# Patient Record
Sex: MALE | Race: WHITE | NOT HISPANIC OR LATINO | URBAN - METROPOLITAN AREA
[De-identification: names, ages, dates, MRNs, and addresses within clinical notes are randomized per-mention and may not be internally consistent; named-entity substitution may affect disease eponyms.]

---

## 2023-07-10 ENCOUNTER — OFFICE VISIT (OUTPATIENT)
Dept: PRIMARY CARE | Facility: CLINIC | Age: 38
End: 2023-07-10
Payer: COMMERCIAL

## 2023-07-10 VITALS
SYSTOLIC BLOOD PRESSURE: 123 MMHG | OXYGEN SATURATION: 94 % | WEIGHT: 224.8 LBS | BODY MASS INDEX: 33.3 KG/M2 | HEIGHT: 69 IN | DIASTOLIC BLOOD PRESSURE: 87 MMHG | TEMPERATURE: 97.8 F | HEART RATE: 83 BPM

## 2023-07-10 DIAGNOSIS — F90.2 ATTENTION DEFICIT HYPERACTIVITY DISORDER (ADHD), COMBINED TYPE: Primary | ICD-10-CM

## 2023-07-10 PROCEDURE — 1036F TOBACCO NON-USER: CPT | Performed by: FAMILY MEDICINE

## 2023-07-10 PROCEDURE — 99203 OFFICE O/P NEW LOW 30 MIN: CPT | Performed by: FAMILY MEDICINE

## 2023-07-10 RX ORDER — DEXTROAMPHETAMINE SACCHARATE, AMPHETAMINE ASPARTATE MONOHYDRATE, DEXTROAMPHETAMINE SULFATE AND AMPHETAMINE SULFATE 7.5; 7.5; 7.5; 7.5 MG/1; MG/1; MG/1; MG/1
30 CAPSULE, EXTENDED RELEASE ORAL EVERY MORNING
COMMUNITY
Start: 2023-03-06 | End: 2023-07-27 | Stop reason: SDUPTHER

## 2023-07-10 RX ORDER — ATOMOXETINE 40 MG/1
40 CAPSULE ORAL 2 TIMES DAILY
Qty: 60 CAPSULE | Refills: 0 | Status: SHIPPED | OUTPATIENT
Start: 2023-07-10 | End: 2023-10-18 | Stop reason: ALTCHOICE

## 2023-07-10 RX ORDER — RIFAXIMIN 550 MG/1
550 TABLET ORAL 3 TIMES DAILY
COMMUNITY
Start: 2022-09-03 | End: 2023-07-10 | Stop reason: ALTCHOICE

## 2023-07-10 ASSESSMENT — ENCOUNTER SYMPTOMS
NERVOUS/ANXIOUS: 0
SLEEP DISTURBANCE: 0
SHORTNESS OF BREATH: 0
EYE REDNESS: 0
DECREASED CONCENTRATION: 1
DYSPHORIC MOOD: 0
PALPITATIONS: 0

## 2023-07-10 NOTE — ASSESSMENT & PLAN NOTE
OARRS checked and all Rxs were from Dr. Chilo Forde. He last had Rx on 3/6/23 per OARRS. No other controlled rxs on OARRS. Will need to return for ADD evaluation appointment.  He is willing to try Strattera in the interim. Records being sent.

## 2023-07-10 NOTE — PROGRESS NOTES
"Subjective   Patient ID: Joseph Behra is a 38 y.o. male who presents for Establish Care (To establish and get medication refills on Adderall. ).    Here as new patient. Dr. Forde prescribed him Adderall for 5-6 yrs. He was trying to get off it. He is back to working for a company. Not working effeiciently. Having issues with executive function, getting things organized. He is thinking he needs to go back on medication. He has not been on a non-stimulant.     He has been off the meds for 5-6 months. He has been struggling.   No history of anxiety or depression. Diagnosed with ADD as child and was to go on Ritalin. Parents objected to it. He always struggled in school and wishes he had been on it. Had not ever been on it until 6 yrs ago or so.     Last cholesterol and sugar were normal 3 yrs ago. He has more appetite now than he did in the past.     No family history of cancer, heart disease or diabetes. Not sure what is up with Dad as on lot of medication.                Current Outpatient Medications:     amphetamine-dextroamphetamine XR (Adderall XR) 30 mg 24 hr capsule, Take 1 capsule (30 mg) by mouth once daily in the morning., Disp: , Rfl:     Patient Active Problem List   Diagnosis    Attention deficit hyperactivity disorder (ADHD), combined type         Review of Systems   Eyes:  Negative for redness.   Respiratory:  Negative for shortness of breath.    Cardiovascular:  Negative for chest pain, palpitations and leg swelling.   Psychiatric/Behavioral:  Positive for decreased concentration. Negative for dysphoric mood and sleep disturbance. The patient is not nervous/anxious.        Objective   /87 (BP Location: Left arm, Patient Position: Sitting)   Pulse 83   Temp 36.6 °C (97.8 °F)   Ht 1.753 m (5' 9\")   Wt 102 kg (224 lb 12.8 oz)   SpO2 94%   BMI 33.20 kg/m²     Physical Exam  Vitals reviewed.   Constitutional:       General: He is not in acute distress.     Appearance: He is not ill-appearing. "   Neck:      Vascular: No carotid bruit.   Cardiovascular:      Rate and Rhythm: Normal rate and regular rhythm.      Pulses: Normal pulses.      Heart sounds: No murmur heard.  Pulmonary:      Effort: Pulmonary effort is normal.      Breath sounds: Normal breath sounds.   Musculoskeletal:      Left lower leg: No edema.   Skin:     Findings: No rash.   Neurological:      Mental Status: He is alert.   Psychiatric:         Mood and Affect: Mood normal.         Assessment/Plan   Problem List Items Addressed This Visit       Attention deficit hyperactivity disorder (ADHD), combined type - Primary     OARRS checked and all Rxs were from Dr. Chilo Forde. He last had Rx on 3/6/23 per OARRS. No other controlled rxs on OARRS. Will need to return for ADD evaluation appointment.  He is willing to try Strattera in the interim. Records being sent.               Assessment, plans, tests, and follow up discussed with patient and patient verbalized understanding. Ramy was given an opportunity to ask questions and  any concerns were addressed including but not limited to medications, need for evaluation. Follow up and need for records. .

## 2023-07-27 ENCOUNTER — OFFICE VISIT (OUTPATIENT)
Dept: PRIMARY CARE | Facility: CLINIC | Age: 38
End: 2023-07-27
Payer: COMMERCIAL

## 2023-07-27 VITALS
OXYGEN SATURATION: 94 % | SYSTOLIC BLOOD PRESSURE: 128 MMHG | RESPIRATION RATE: 17 BRPM | TEMPERATURE: 97.8 F | DIASTOLIC BLOOD PRESSURE: 87 MMHG | WEIGHT: 231 LBS | HEART RATE: 77 BPM | BODY MASS INDEX: 34.21 KG/M2 | HEIGHT: 69 IN

## 2023-07-27 DIAGNOSIS — F90.2 ATTENTION DEFICIT HYPERACTIVITY DISORDER (ADHD), COMBINED TYPE: Primary | ICD-10-CM

## 2023-07-27 PROCEDURE — 99213 OFFICE O/P EST LOW 20 MIN: CPT | Performed by: FAMILY MEDICINE

## 2023-07-27 PROCEDURE — 1036F TOBACCO NON-USER: CPT | Performed by: FAMILY MEDICINE

## 2023-07-27 RX ORDER — DEXTROAMPHETAMINE SACCHARATE, AMPHETAMINE ASPARTATE MONOHYDRATE, DEXTROAMPHETAMINE SULFATE AND AMPHETAMINE SULFATE 7.5; 7.5; 7.5; 7.5 MG/1; MG/1; MG/1; MG/1
30 CAPSULE, EXTENDED RELEASE ORAL EVERY MORNING
Qty: 30 CAPSULE | Refills: 0 | Status: SHIPPED | OUTPATIENT
Start: 2023-07-27 | End: 2023-10-18 | Stop reason: SDUPTHER

## 2023-07-27 ASSESSMENT — ENCOUNTER SYMPTOMS
DYSPHORIC MOOD: 0
AGITATION: 0
CONFUSION: 0
HYPERACTIVE: 0
DEPRESSION: 0
OCCASIONAL FEELINGS OF UNSTEADINESS: 0
DECREASED CONCENTRATION: 0
NERVOUS/ANXIOUS: 0
PALPITATIONS: 0
HALLUCINATIONS: 0
LOSS OF SENSATION IN FEET: 0

## 2023-07-27 ASSESSMENT — PATIENT HEALTH QUESTIONNAIRE - PHQ9
SUM OF ALL RESPONSES TO PHQ9 QUESTIONS 1 AND 2: 0
1. LITTLE INTEREST OR PLEASURE IN DOING THINGS: NOT AT ALL
2. FEELING DOWN, DEPRESSED OR HOPELESS: NOT AT ALL

## 2023-07-27 ASSESSMENT — COLUMBIA-SUICIDE SEVERITY RATING SCALE - C-SSRS
2. HAVE YOU ACTUALLY HAD ANY THOUGHTS OF KILLING YOURSELF?: NO
6. HAVE YOU EVER DONE ANYTHING, STARTED TO DO ANYTHING, OR PREPARED TO DO ANYTHING TO END YOUR LIFE?: NO
1. IN THE PAST MONTH, HAVE YOU WISHED YOU WERE DEAD OR WISHED YOU COULD GO TO SLEEP AND NOT WAKE UP?: NO

## 2023-07-27 NOTE — PROGRESS NOTES
"Subjective   Patient ID: Joseph Behra is a 38 y.o. male who presents for Follow-up (Follow up on ADHD).    Here to evaluate for ADHD and resumption of stimulant therapy.   Diagnosed at age 10. Did welll ofn Adderall XR 30 mg daily. Tried Strattera and did not like side effects. Was on Vyvanse in the past.            Current Outpatient Medications:     atomoxetine (Strattera) 40 mg capsule, Take 1 capsule (40 mg) by mouth 2 times a day. Swallow capsule whole; do not open. If opened accidentally, do not touch eyes; wash hands immediately (product is an eye irritant)., Disp: 60 capsule, Rfl: 0    amphetamine-dextroamphetamine XR (Adderall XR) 30 mg 24 hr capsule, Take 1 capsule (30 mg) by mouth once daily in the morning., Disp: 30 capsule, Rfl: 0    Patient Active Problem List   Diagnosis    Attention deficit hyperactivity disorder (ADHD), combined type         Review of Systems   Cardiovascular:  Negative for chest pain, palpitations and leg swelling.   Psychiatric/Behavioral:  Negative for agitation, behavioral problems, confusion, decreased concentration, dysphoric mood, hallucinations and self-injury. The patient is not nervous/anxious and is not hyperactive.        Objective   /87 (BP Location: Left arm, Patient Position: Sitting, BP Cuff Size: Large adult)   Pulse 77   Temp 36.6 °C (97.8 °F)   Resp 17   Ht 1.753 m (5' 9\")   Wt 105 kg (231 lb)   SpO2 94%   BMI 34.11 kg/m²     Physical Exam    Assessment/Plan   Problem List Items Addressed This Visit       Attention deficit hyperactivity disorder (ADHD), combined type - Primary    Relevant Medications    amphetamine-dextroamphetamine XR (Adderall XR) 30 mg 24 hr capsule    Other Relevant Orders    Follow Up In Primary Care         Assessment, plans, tests, and follow up discussed with patient and patient verbalized understanding. Ramy was given an opportunity to ask questions and  any concerns were addressed including but not limited to labs, old " diagnoses, medications, follow up.

## 2023-07-27 NOTE — ASSESSMENT & PLAN NOTE
Anticoagulation  Patient is here for anticoagulation follow-up. Indication: atrial fibrillation  Bleeding Signs/Symptoms:  None  Thromboembolic Signs/Symptoms:  None    Missed Coumadin Doses:  None  Medication Changes:  no  Dietary Changes:  no  Bacterial/Viral Infection:  yes - patient has a cold    Other Concerns:  no    Last INR: 2.3            Today's INR: 2.9        INR Goal: (X) 2.0-3.0  ( ) 2.5-3.5  ( ) ________    Advised patient to take warfarin as noted on medication chart given today:  Take 2.5 tablets (6.25mg) on Tuesdays and Saturdays and take 2 tablets (5mg) all other days  Total weekly dosage of warfarin: 37.5 mg    Return for INR recheck in/on: four weeks    10 minutes was spent in reviewing and counseling regarding foods high in vitamin K, the importance of taking medication daily and watching for any medication side effects such as bleeding for which the patient will call for advice or go the the Emergency Room immediately. Patient was notified that their INR result was within therapeutic range. Patient was instructed to contact us with any unusual bleeding, bruising, any changes in medications, diet or health status and if there are any other questions or concerns. Patient verbalized understanding of above. DIVA 2.0 interview form completed. Meets criteria. OARRS reviewed last visit and consistent with patient history. Did not tolerate Strattera Discontinued. Resume Adderall XR 30 mg daily. Contract signed.

## 2023-07-27 NOTE — PATIENT INSTRUCTIONS
If your pharmacy does not have your medication, you need to see if you find a pharmacy that has it.     Call if issues. Followup in one month.

## 2023-08-27 PROBLEM — F90.2 ATTENTION DEFICIT HYPERACTIVITY DISORDER (ADHD), COMBINED TYPE: Status: RESOLVED | Noted: 2023-07-10 | Resolved: 2023-08-27

## 2023-09-13 ENCOUNTER — APPOINTMENT (OUTPATIENT)
Dept: PRIMARY CARE | Facility: CLINIC | Age: 38
End: 2023-09-13
Payer: COMMERCIAL

## 2023-09-25 ENCOUNTER — TELEPHONE (OUTPATIENT)
Dept: PRIMARY CARE | Facility: CLINIC | Age: 38
End: 2023-09-25
Payer: COMMERCIAL

## 2023-09-25 DIAGNOSIS — F90.2 ATTENTION DEFICIT HYPERACTIVITY DISORDER (ADHD), COMBINED TYPE: ICD-10-CM

## 2023-09-25 NOTE — TELEPHONE ENCOUNTER
Rx Refill Request Telephone Encounter    Name:  Joseph Behra  :  [id number]  Medication Name:  Adderall XR   Dose : 30 mg  Route : daily    30    Specific Pharmacy location:  [facility]  Date of last appointment:    Date of next appointment:  Oct 12  Best number to reach patient:  [phone]

## 2023-10-12 ENCOUNTER — APPOINTMENT (OUTPATIENT)
Dept: PRIMARY CARE | Facility: CLINIC | Age: 38
End: 2023-10-12
Payer: COMMERCIAL

## 2023-10-13 ENCOUNTER — TELEPHONE (OUTPATIENT)
Dept: PRIMARY CARE | Facility: CLINIC | Age: 38
End: 2023-10-13

## 2023-10-13 DIAGNOSIS — F90.2 ATTENTION DEFICIT HYPERACTIVITY DISORDER (ADHD), COMBINED TYPE: ICD-10-CM

## 2023-10-13 RX ORDER — DEXTROAMPHETAMINE SACCHARATE, AMPHETAMINE ASPARTATE MONOHYDRATE, DEXTROAMPHETAMINE SULFATE AND AMPHETAMINE SULFATE 7.5; 7.5; 7.5; 7.5 MG/1; MG/1; MG/1; MG/1
30 CAPSULE, EXTENDED RELEASE ORAL EVERY MORNING
Qty: 30 CAPSULE | Refills: 0 | OUTPATIENT
Start: 2023-12-12 | End: 2024-01-11

## 2023-10-13 RX ORDER — DEXTROAMPHETAMINE SACCHARATE, AMPHETAMINE ASPARTATE MONOHYDRATE, DEXTROAMPHETAMINE SULFATE AND AMPHETAMINE SULFATE 7.5; 7.5; 7.5; 7.5 MG/1; MG/1; MG/1; MG/1
30 CAPSULE, EXTENDED RELEASE ORAL EVERY MORNING
Qty: 30 CAPSULE | Refills: 0 | OUTPATIENT
Start: 2023-11-12 | End: 2023-12-12

## 2023-10-13 RX ORDER — DEXTROAMPHETAMINE SACCHARATE, AMPHETAMINE ASPARTATE MONOHYDRATE, DEXTROAMPHETAMINE SULFATE AND AMPHETAMINE SULFATE 7.5; 7.5; 7.5; 7.5 MG/1; MG/1; MG/1; MG/1
30 CAPSULE, EXTENDED RELEASE ORAL EVERY MORNING
Qty: 30 CAPSULE | Refills: 0 | OUTPATIENT
Start: 2023-10-13 | End: 2023-11-12

## 2023-10-13 NOTE — TELEPHONE ENCOUNTER
He was due to follow up in one month after that was written in July. He has been out for a while. He needs to come to his appointment to discuss.

## 2023-10-13 NOTE — TELEPHONE ENCOUNTER
Rx Refill Request Telephone Encounter    Name:  Joseph Behra  :  [id number]  Medication Name:      amphetamine-dextroamphetamine XR (Adderall XR) 30 mg 24 hr capsule           Specific Pharmacy location:   [facility]/pharmacy #[id number] [address]         Date of last appointment:  23  Date of next appointment:  10/18/23  Best number to reach patient: [phone]

## 2023-10-18 ENCOUNTER — OFFICE VISIT (OUTPATIENT)
Dept: PRIMARY CARE | Facility: CLINIC | Age: 38
End: 2023-10-18
Payer: COMMERCIAL

## 2023-10-18 VITALS
BODY MASS INDEX: 34.17 KG/M2 | HEART RATE: 72 BPM | SYSTOLIC BLOOD PRESSURE: 128 MMHG | DIASTOLIC BLOOD PRESSURE: 90 MMHG | TEMPERATURE: 98.1 F | OXYGEN SATURATION: 95 % | WEIGHT: 231.4 LBS

## 2023-10-18 DIAGNOSIS — F90.2 ATTENTION DEFICIT HYPERACTIVITY DISORDER (ADHD), COMBINED TYPE: ICD-10-CM

## 2023-10-18 DIAGNOSIS — Z13.1 SCREENING FOR DIABETES MELLITUS: ICD-10-CM

## 2023-10-18 DIAGNOSIS — Z13.220 SCREENING, LIPID: ICD-10-CM

## 2023-10-18 DIAGNOSIS — Z28.9 VACCINATION NOT CARRIED OUT: ICD-10-CM

## 2023-10-18 DIAGNOSIS — Z00.00 WELL ADULT EXAM: Primary | ICD-10-CM

## 2023-10-18 PROCEDURE — 1036F TOBACCO NON-USER: CPT | Performed by: FAMILY MEDICINE

## 2023-10-18 PROCEDURE — 99395 PREV VISIT EST AGE 18-39: CPT | Performed by: FAMILY MEDICINE

## 2023-10-18 RX ORDER — DEXTROAMPHETAMINE SACCHARATE, AMPHETAMINE ASPARTATE MONOHYDRATE, DEXTROAMPHETAMINE SULFATE AND AMPHETAMINE SULFATE 7.5; 7.5; 7.5; 7.5 MG/1; MG/1; MG/1; MG/1
30 CAPSULE, EXTENDED RELEASE ORAL EVERY MORNING
Qty: 30 CAPSULE | Refills: 0 | Status: SHIPPED | OUTPATIENT
Start: 2023-10-18 | End: 2023-11-17

## 2023-10-18 ASSESSMENT — ENCOUNTER SYMPTOMS
HEADACHES: 0
SHORTNESS OF BREATH: 0
DIZZINESS: 0
TROUBLE SWALLOWING: 0
APPETITE CHANGE: 0
POLYPHAGIA: 0
SEIZURES: 0
JOINT SWELLING: 0
FREQUENCY: 0
DIARRHEA: 0
NAUSEA: 0
POLYDIPSIA: 0
DIFFICULTY URINATING: 0
NERVOUS/ANXIOUS: 0
ARTHRALGIAS: 0
COUGH: 0
UNEXPECTED WEIGHT CHANGE: 0
ABDOMINAL PAIN: 0
FATIGUE: 0
DECREASED CONCENTRATION: 1
ACTIVITY CHANGE: 0
PALPITATIONS: 0
CONSTIPATION: 0
BLOOD IN STOOL: 0
VOMITING: 0
WHEEZING: 0
DYSPHORIC MOOD: 0

## 2023-10-18 NOTE — PATIENT INSTRUCTIONS
Resume Adderal. Follow up one month.    Recommend flu and covid shots.     Have fasting labs before follow up.

## 2023-10-18 NOTE — PROGRESS NOTES
Subjective   Patient ID: Joseph Behra is a 38 y.o. male who presents for Annual Exam (Physical and medication refills ).    Patient here to follow up on Dx ADHD and for physical.     Was seen on 7/23/23 and ADHD evaluation with structured DIVA interview consistent with ADHD combined. Started on Adderall and was to follow up in one month. Contract was signed. He states has not been able to get back due to insurance confusion, work issues. .  Not been back for appointment. Was given Adderall XR 30 mg daily. He liked it. No side effects. Helped him function at work. When on it, he can stay on task much better.     Here for annual wellness exam. Last wellness several years.     New concerns:no  Feels: well    Changes  to health/medications since last visit Tried Adderall, been out for a while  Other providers seen since last visit none    Healthy lifestyle habits: Regular exercise: plenty                                        Dietary habits: could be better  Does YoYo diet.                                         Social connections/relationships good                                        Wears seatbelts Yes                                         Sunscreen No, counseled.                                         Sexual Risk Factors No      Health screenings:  PSA not applicable                                  Colon screening not applicable                                  Hep C screening No, declines.                                   HIV screening no, declines                                  STI screeningno                          Health risks: Domestic Violence no                      Work-life balance good                      Smoke detectors Yes                       Carbon monoxide detectors yes                      Gun safety not applicable                      Second-hand Smoke No                       Occupational Risks No     Immunizations:  Influenza:  No                             Tdap: 2010 - he believes  he had it done in 2019 when wife had baby.                             HPV: n/a                           Pneumonia: not applicable                           Shingrix: not applicable                           COVID: No and declines.                Current Outpatient Medications:     amphetamine-dextroamphetamine XR (Adderall XR) 30 mg 24 hr capsule, Take 1 capsule (30 mg) by mouth once daily in the morning., Disp: 30 capsule, Rfl: 0    Patient Active Problem List   Diagnosis    Attention deficit hyperactivity disorder (ADHD), combined type         Review of Systems   Constitutional:  Negative for activity change, appetite change, fatigue and unexpected weight change.   HENT:  Negative for dental problem, hearing loss and trouble swallowing.    Eyes:  Negative for visual disturbance.   Respiratory:  Negative for cough, shortness of breath and wheezing.    Cardiovascular:  Negative for chest pain, palpitations and leg swelling.   Gastrointestinal:  Negative for abdominal pain, blood in stool, constipation, diarrhea, nausea and vomiting.   Endocrine: Negative for cold intolerance, heat intolerance, polydipsia, polyphagia and polyuria.   Genitourinary:  Negative for difficulty urinating, frequency, testicular pain and urgency.   Musculoskeletal:  Negative for arthralgias and joint swelling.   Skin:  Negative for rash.   Neurological:  Negative for dizziness, seizures, syncope and headaches.   Psychiatric/Behavioral:  Positive for decreased concentration. Negative for behavioral problems and dysphoric mood. The patient is not nervous/anxious.        Objective   /90 (BP Location: Left arm, Patient Position: Sitting)   Pulse 72   Temp 36.7 °C (98.1 °F)   Wt 105 kg (231 lb 6.4 oz)   SpO2 95%   BMI 34.17 kg/m²     Physical Exam  Constitutional:       Appearance: Normal appearance.   HENT:      Head: Normocephalic and atraumatic.      Right Ear: Tympanic membrane normal.      Left Ear: Tympanic membrane normal.       Nose: Nose normal.      Mouth/Throat:      Pharynx: Oropharynx is clear.   Eyes:      Extraocular Movements: Extraocular movements intact.      Conjunctiva/sclera: Conjunctivae normal.      Pupils: Pupils are equal, round, and reactive to light.   Neck:      Vascular: No carotid bruit.   Cardiovascular:      Rate and Rhythm: Normal rate and regular rhythm.      Pulses: Normal pulses.      Heart sounds: No murmur heard.  Pulmonary:      Effort: Pulmonary effort is normal.      Breath sounds: Normal breath sounds.   Abdominal:      Palpations: There is no hepatomegaly, splenomegaly or mass.      Tenderness: There is no abdominal tenderness.   Musculoskeletal:         General: Normal range of motion.      Cervical back: Normal range of motion.      Left lower leg: No edema.   Skin:     General: Skin is warm and dry.      Findings: No rash.   Neurological:      General: No focal deficit present.      Mental Status: He is alert. Mental status is at baseline.      Gait: Gait is intact.   Psychiatric:         Mood and Affect: Mood normal.         Thought Content: Thought content normal.         Assessment/Plan   Problem List Items Addressed This Visit       Attention deficit hyperactivity disorder (ADHD), combined type     Discussed need to keep follow up appointments, monitor and that cannot continue to prescribe if does not follow contract.   Will rx for 30 days and follow up while on medication. Need to recheck BP and get UDS one month.           Other Visit Diagnoses       Well adult exam    -  Primary    Discussed healthy lifestyle, health maintenance for age, immunizations    Vaccination not carried out        Due for flu, Tdap, COVID, patient declines    Screening for diabetes mellitus        Relevant Orders    Hemoglobin A1C    Basic Metabolic Panel    Screening, lipid        Relevant Orders    Lipid Panel              Assessment, plans, tests, and follow up discussed with patient and patient verbalized  understanding. Ramy was given an opportunity to ask questions and  any concerns were addressed including but not limited to medication, requirements to follow up, labs, prevention for age. .

## 2023-10-18 NOTE — ASSESSMENT & PLAN NOTE
Discussed need to keep follow up appointments, monitor and that cannot continue to prescribe if does not follow contract.   Will rx for 30 days and follow up while on medication. Need to recheck BP and get UDS one month.